# Patient Record
Sex: MALE | Race: WHITE | NOT HISPANIC OR LATINO | Employment: FULL TIME | URBAN - NONMETROPOLITAN AREA
[De-identification: names, ages, dates, MRNs, and addresses within clinical notes are randomized per-mention and may not be internally consistent; named-entity substitution may affect disease eponyms.]

---

## 2017-10-09 ENCOUNTER — OFFICE VISIT (OUTPATIENT)
Dept: URGENT CARE | Facility: PHYSICIAN GROUP | Age: 47
End: 2017-10-09
Payer: MEDICAID

## 2017-10-09 VITALS
RESPIRATION RATE: 16 BRPM | BODY MASS INDEX: 46.65 KG/M2 | WEIGHT: 315 LBS | SYSTOLIC BLOOD PRESSURE: 126 MMHG | DIASTOLIC BLOOD PRESSURE: 70 MMHG | HEIGHT: 69 IN | OXYGEN SATURATION: 98 % | TEMPERATURE: 97.7 F | HEART RATE: 102 BPM

## 2017-10-09 DIAGNOSIS — H10.31 ACUTE CONJUNCTIVITIS OF RIGHT EYE, UNSPECIFIED ACUTE CONJUNCTIVITIS TYPE: ICD-10-CM

## 2017-10-09 PROCEDURE — 99214 OFFICE O/P EST MOD 30 MIN: CPT | Performed by: PHYSICIAN ASSISTANT

## 2017-10-09 RX ORDER — POLYMYXIN B SULFATE AND TRIMETHOPRIM 1; 10000 MG/ML; [USP'U]/ML
1 SOLUTION OPHTHALMIC EVERY 4 HOURS
Qty: 10 ML | Refills: 0 | Status: SHIPPED | OUTPATIENT
Start: 2017-10-09 | End: 2017-10-16

## 2017-10-09 NOTE — PROGRESS NOTES
"Chief Complaint   Patient presents with   • Eye Problem     Rt side, yellow drainage       HISTORY OF PRESENT ILLNESS: Patient is a 47 y.o. male who presents today for the following:    Patient comes in for evaluation of right red eye that is itching, burning, and with exudate. Started last night. He woke up with a slightly stuck together this morning. He denies blurry vision, ocular pain, informed by sensation. He does not wear contact lenses. He has not tried any over-the-counter medication.    Patient Active Problem List    Diagnosis Date Noted   • Chronic combined systolic and diastolic congestive heart failure (CMS-HCC) 04/19/2016   • Abnormal CT scan, chest 04/19/2016   • Pulmonary HTN 04/19/2016   • Type 2 diabetes mellitus, controlled (CMS-HCC) 04/19/2016   • Hep C w/o coma, chronic (CMS-HCC) 09/11/2015   • Proteinuria 09/11/2015   • Hyperlipidemia 09/11/2015   • Bilateral chronic knee pain 09/11/2015   • Essential hypertension 09/02/2015   • Mild intermittent asthma without complication 09/02/2015   • Tobacco dependence 09/02/2015   • Right-sided low back pain without sciatica 09/02/2015       Allergies:Pcn [penicillins]    Current Outpatient Prescriptions Ordered in [facility]   Medication Sig Dispense Refill   • polymixin-trimethoprim (POLYTRIM) 43136-3.1 UNIT/ML-% Solution Place 1 Drop in both eyes every 4 hours for 7 days. 10 mL 0   • Ivabradine HCl 5 MG Tab Take  by mouth.     • carvedilol (COREG) 6.25 MG Tab Take 6.25 mg by mouth 2 times a day, with meals.     • Insulin Detemir (LEVEMIR FLEXPEN SC) Inject  as instructed.     • lisinopril (PRINIVIL) 5 MG Tab      • NON SPECIFIED DM testing supplies: glucometer, test strips, lancets  Dispense brand covered by patients insurance (TrueTest/True Result)  Testing frequency: BID and prn, #100, RF x 11  250.00 1 Act 0   • Insulin Syringe-Needle U-100 (INSULIN SYRINGE .3CC/29GX1/2\") 29G X 1/2\" 0.3 ML Misc To inject insulin once daily 30 Each 3   • cyclobenzaprine " (FLEXERIL) 10 MG Tab Take 10 mg by mouth 3 times a day as needed.     • ibuprofen (MOTRIN) 600 MG Tab Take 600 mg by mouth every 6 hours as needed.     • albuterol (VENTOLIN OR PROVENTIL) 108 (90 BASE) MCG/ACT Aero Soln inhalation aerosol Inhale 2 Puffs by mouth every 6 hours as needed. 8.5 g 0   • furosemide (LASIX) 20 MG Tab      • magnesium oxide (MAG-OX) 400 (241.3 MG) MG Tab tablet      • simvastatin (ZOCOR) 40 MG Tab      • spironolactone (ALDACTONE) 25 MG Tab        No current Epic-ordered facility-administered medications on file.        Past Medical History:   Diagnosis Date   • Chronic combined systolic and diastolic congestive heart failure (CMS-HCC) 2016    Severe systolic and diastolic CHF, EF 20% ECHO 2016   • Abnormal CT scan, chest 2016   • Pulmonary HTN 2016   • \ 2016   • Hep C w/o coma, chronic (CMS-HCC) 2015    Genotype 1A   • Proteinuria 2015    Noted on labs from [place] 2014   • Hyperlipidemia 2015    ; 2014   • Bilateral chronic knee pain 2015    chondromalacia patella   • Essential hypertension 2015   • Mild intermittent asthma without complication 2015   • Asthma    • Type II or unspecified type diabetes mellitus without mention of complication, not stated as uncontrolled        Social History   Substance Use Topics   • Smoking status: Former Smoker     Packs/day: 0.50     Years: 30.00     Types: Cigarettes     Quit date: 2017   • Smokeless tobacco: Never Used   • Alcohol use 2.4 - 3.0 oz/week     4 - 5 Cans of beer per week       Family Status   Relation Status   • Mother  at age 69   • Father Other   • Sister Alive   • Maternal Grandmother  at age 91   • Maternal Grandfather     90   • Maternal Uncle    • Paternal Grandfather    • Neg Hx      Family History   Problem Relation Age of Onset   • Diabetes Mother      amputee   • Heart Disease Maternal Grandmother    • Diabetes Maternal Uncle    • Cancer  "Paternal Grandfather    • Lung Disease Paternal Grandfather    • Stroke Neg Hx        ROS:    Review of Systems   Constitutional: Negative for fever, chills, weight loss and malaise/fatigue.   HENT: Negative for ear pain, nosebleeds, congestion, sore throat and neck pain.    Eyes: Negative for blurred vision.   Respiratory: Negative for cough, sputum production, shortness of breath and wheezing.    Cardiovascular: Negative for chest pain, palpitations, orthopnea and leg swelling.   Gastrointestinal: Negative for heartburn, nausea, vomiting and abdominal pain.   Genitourinary: Negative for dysuria, urgency and frequency.       Exam:  Blood pressure 126/70, pulse (!) 102, temperature 36.5 °C (97.7 °F), resp. rate 16, height 1.753 m (5' 9\"), weight (!) 168.6 kg (371 lb 9.6 oz), SpO2 98 %.  General: Well developed, well nourished. No distress.  HEENT: Conjunctiva injected in the right eye only with a mild amount of exudate. PERRL, EOMI.  Pulmonary: Clear to ausculation and percussion.  Normal effort. No rales, ronchi, or wheezing.   Cardiovascular: Regular rate and rhythm without murmur. No edema.   Neurologic: Grossly nonfocal.  Skin: Warm, dry, good turgor. No rashes in visible areas.   Psych: Normal mood. Alert and oriented x3. Judgment and insight is normal.    Assessment/Plan:  Use all medication as directed. Follow up for worsening or persistent symptoms.  1. Acute conjunctivitis of right eye, unspecified acute conjunctivitis type  polymixin-trimethoprim (POLYTRIM) 64694-9.1 UNIT/ML-% Solution       "

## 2017-10-17 ENCOUNTER — NON-PROVIDER VISIT (OUTPATIENT)
Dept: URGENT CARE | Facility: PHYSICIAN GROUP | Age: 47
End: 2017-10-17

## 2017-10-17 DIAGNOSIS — Z02.1 PRE-EMPLOYMENT DRUG SCREENING: ICD-10-CM

## 2017-10-17 LAB
AMP AMPHETAMINE: NORMAL
COC COCAINE: NORMAL
INT CON NEG: NEGATIVE
INT CON POS: POSITIVE
MET METHAMPHETAMINES: NORMAL
OPI OPIATES: NORMAL
PCP PHENCYCLIDINE: NORMAL
POC DRUG COMMENT 753798-OCCUPATIONAL HEALTH: NEGATIVE
THC: NORMAL

## 2017-10-17 PROCEDURE — 80305 DRUG TEST PRSMV DIR OPT OBS: CPT | Performed by: FAMILY MEDICINE

## 2018-01-10 ENCOUNTER — HOSPITAL ENCOUNTER (OUTPATIENT)
Dept: RADIOLOGY | Facility: MEDICAL CENTER | Age: 48
End: 2018-01-10
Attending: INTERNAL MEDICINE
Payer: MEDICAID

## 2018-01-10 DIAGNOSIS — B18.2 CHRONIC HEPATITIS C WITH HEPATIC COMA (HCC): ICD-10-CM

## 2018-01-10 DIAGNOSIS — I42.8 CARDIOMYOPATHY, NONISCHEMIC (HCC): ICD-10-CM

## 2018-01-10 DIAGNOSIS — J45.909 UNCOMPLICATED ASTHMA, UNSPECIFIED ASTHMA SEVERITY, UNSPECIFIED WHETHER PERSISTENT: ICD-10-CM

## 2018-01-10 DIAGNOSIS — E11.9 DIABETES MELLITUS WITHOUT COMPLICATION (HCC): ICD-10-CM

## 2018-01-10 PROCEDURE — 76700 US EXAM ABDOM COMPLETE: CPT

## 2018-02-26 ENCOUNTER — NON-PROVIDER VISIT (OUTPATIENT)
Dept: URGENT CARE | Facility: PHYSICIAN GROUP | Age: 48
End: 2018-02-26

## 2018-02-26 DIAGNOSIS — Z02.1 PRE-EMPLOYMENT DRUG SCREENING: ICD-10-CM

## 2018-02-26 LAB
AMP AMPHETAMINE: NORMAL
COC COCAINE: NORMAL
INT CON NEG: NORMAL
INT CON POS: NORMAL
MET METHAMPHETAMINES: NORMAL
OPI OPIATES: NORMAL
PCP PHENCYCLIDINE: NORMAL
POC DRUG COMMENT 753798-OCCUPATIONAL HEALTH: NEGATIVE
THC: NORMAL

## 2018-02-26 PROCEDURE — 80305 DRUG TEST PRSMV DIR OPT OBS: CPT | Performed by: PHYSICIAN ASSISTANT

## 2019-07-27 ENCOUNTER — HOSPITAL ENCOUNTER (INPATIENT)
Dept: HOSPITAL 8 - ED | Age: 49
LOS: 1 days | Discharge: LEFT BEFORE DISCHARGE | DRG: 292 | End: 2019-07-28
Attending: HOSPITALIST | Admitting: HOSPITALIST
Payer: MEDICAID

## 2019-07-27 VITALS — SYSTOLIC BLOOD PRESSURE: 122 MMHG | DIASTOLIC BLOOD PRESSURE: 85 MMHG

## 2019-07-27 VITALS — SYSTOLIC BLOOD PRESSURE: 121 MMHG | DIASTOLIC BLOOD PRESSURE: 87 MMHG

## 2019-07-27 VITALS — WEIGHT: 224.87 LBS | BODY MASS INDEX: 33.31 KG/M2 | HEIGHT: 69 IN

## 2019-07-27 VITALS — SYSTOLIC BLOOD PRESSURE: 108 MMHG | DIASTOLIC BLOOD PRESSURE: 77 MMHG

## 2019-07-27 VITALS — DIASTOLIC BLOOD PRESSURE: 77 MMHG | SYSTOLIC BLOOD PRESSURE: 109 MMHG

## 2019-07-27 VITALS — DIASTOLIC BLOOD PRESSURE: 76 MMHG | SYSTOLIC BLOOD PRESSURE: 107 MMHG

## 2019-07-27 DIAGNOSIS — D64.9: ICD-10-CM

## 2019-07-27 DIAGNOSIS — J45.909: ICD-10-CM

## 2019-07-27 DIAGNOSIS — E78.5: ICD-10-CM

## 2019-07-27 DIAGNOSIS — I42.9: ICD-10-CM

## 2019-07-27 DIAGNOSIS — Z91.14: ICD-10-CM

## 2019-07-27 DIAGNOSIS — Z82.49: ICD-10-CM

## 2019-07-27 DIAGNOSIS — I24.8: ICD-10-CM

## 2019-07-27 DIAGNOSIS — E11.65: ICD-10-CM

## 2019-07-27 DIAGNOSIS — F17.210: ICD-10-CM

## 2019-07-27 DIAGNOSIS — Z83.3: ICD-10-CM

## 2019-07-27 DIAGNOSIS — I11.0: Primary | ICD-10-CM

## 2019-07-27 DIAGNOSIS — Z59.0: ICD-10-CM

## 2019-07-27 DIAGNOSIS — I50.43: ICD-10-CM

## 2019-07-27 LAB
ALBUMIN SERPL-MCNC: 2.4 G/DL (ref 3.4–5)
ANION GAP SERPL CALC-SCNC: 8 MMOL/L (ref 5–15)
BASOPHILS # BLD AUTO: 0.02 X10^3/UL (ref 0–0.1)
BASOPHILS NFR BLD AUTO: 0 % (ref 0–1)
CALCIUM SERPL-MCNC: 8.5 MG/DL (ref 8.5–10.1)
CHLORIDE SERPL-SCNC: 108 MMOL/L (ref 98–107)
CREAT SERPL-MCNC: 1.1 MG/DL (ref 0.7–1.3)
EOSINOPHIL # BLD AUTO: 0.12 X10^3/UL (ref 0–0.4)
EOSINOPHIL NFR BLD AUTO: 2 % (ref 1–7)
ERYTHROCYTE [DISTWIDTH] IN BLOOD BY AUTOMATED COUNT: 15.4 % (ref 9.4–14.8)
EST. AVERAGE GLUCOSE BLD GHB EST-MCNC: 206 MG/DL (ref 0–126)
HBA1C MFR BLD: 8.8 % (ref 4.2–6.3)
LYMPHOCYTES # BLD AUTO: 1.47 X10^3/UL (ref 1–3.4)
LYMPHOCYTES NFR BLD AUTO: 23 % (ref 22–44)
MCH RBC QN AUTO: 26.1 PG (ref 27.5–34.5)
MCHC RBC AUTO-ENTMCNC: 31.6 G/DL (ref 33.2–36.2)
MCV RBC AUTO: 82.5 FL (ref 81–97)
MD: NO
MONOCYTES # BLD AUTO: 0.39 X10^3/UL (ref 0.2–0.8)
MONOCYTES NFR BLD AUTO: 6 % (ref 2–9)
NEUTROPHILS # BLD AUTO: 4.36 X10^3/UL (ref 1.8–6.8)
NEUTROPHILS NFR BLD AUTO: 69 % (ref 42–75)
PLATELET # BLD AUTO: 321 X10^3/UL (ref 130–400)
PMV BLD AUTO: 8.1 FL (ref 7.4–10.4)
RBC # BLD AUTO: 4.63 X10^6/UL (ref 4.38–5.82)
TROPONIN I SERPL-MCNC: 0.4 NG/ML (ref 0–0.04)
TROPONIN I SERPL-MCNC: 0.46 NG/ML (ref 0–0.04)
TROPONIN I SERPL-MCNC: 0.54 NG/ML (ref 0–0.04)

## 2019-07-27 PROCEDURE — 71045 X-RAY EXAM CHEST 1 VIEW: CPT

## 2019-07-27 PROCEDURE — 85025 COMPLETE CBC W/AUTO DIFF WBC: CPT

## 2019-07-27 PROCEDURE — 93306 TTE W/DOPPLER COMPLETE: CPT

## 2019-07-27 PROCEDURE — 84100 ASSAY OF PHOSPHORUS: CPT

## 2019-07-27 PROCEDURE — 82040 ASSAY OF SERUM ALBUMIN: CPT

## 2019-07-27 PROCEDURE — 82010 KETONE BODYS QUAN: CPT

## 2019-07-27 PROCEDURE — 93970 EXTREMITY STUDY: CPT

## 2019-07-27 PROCEDURE — 83036 HEMOGLOBIN GLYCOSYLATED A1C: CPT

## 2019-07-27 PROCEDURE — 80307 DRUG TEST PRSMV CHEM ANLYZR: CPT

## 2019-07-27 PROCEDURE — 36415 COLL VENOUS BLD VENIPUNCTURE: CPT

## 2019-07-27 PROCEDURE — 84443 ASSAY THYROID STIM HORMONE: CPT

## 2019-07-27 PROCEDURE — 82800 BLOOD PH: CPT

## 2019-07-27 PROCEDURE — 80061 LIPID PANEL: CPT

## 2019-07-27 PROCEDURE — 82962 GLUCOSE BLOOD TEST: CPT

## 2019-07-27 PROCEDURE — 82803 BLOOD GASES ANY COMBINATION: CPT

## 2019-07-27 PROCEDURE — 84484 ASSAY OF TROPONIN QUANT: CPT

## 2019-07-27 PROCEDURE — 93005 ELECTROCARDIOGRAM TRACING: CPT

## 2019-07-27 PROCEDURE — 80048 BASIC METABOLIC PNL TOTAL CA: CPT

## 2019-07-27 PROCEDURE — 36600 WITHDRAWAL OF ARTERIAL BLOOD: CPT

## 2019-07-27 PROCEDURE — 83880 ASSAY OF NATRIURETIC PEPTIDE: CPT

## 2019-07-27 PROCEDURE — 83735 ASSAY OF MAGNESIUM: CPT

## 2019-07-27 RX ADMIN — LISINOPRIL SCH MG: 5 TABLET ORAL at 20:03

## 2019-07-27 RX ADMIN — LISINOPRIL SCH MG: 5 TABLET ORAL at 10:33

## 2019-07-27 RX ADMIN — CARVEDILOL SCH MG: 3.12 TABLET, FILM COATED ORAL at 17:00

## 2019-07-27 RX ADMIN — FUROSEMIDE SCH MG: 10 INJECTION, SOLUTION INTRAMUSCULAR; INTRAVENOUS at 17:00

## 2019-07-27 RX ADMIN — FUROSEMIDE SCH MG: 10 INJECTION, SOLUTION INTRAMUSCULAR; INTRAVENOUS at 10:33

## 2019-07-27 RX ADMIN — INSULIN LISPRO SCH UNITS: 100 INJECTION, SOLUTION INTRAVENOUS; SUBCUTANEOUS at 20:09

## 2019-07-27 RX ADMIN — CARVEDILOL SCH MG: 3.12 TABLET, FILM COATED ORAL at 10:33

## 2019-07-27 RX ADMIN — NICOTINE SCH PATCH: 14 PATCH, EXTENDED RELEASE TRANSDERMAL at 10:32

## 2019-07-27 RX ADMIN — INSULIN LISPRO SCH UNITS: 100 INJECTION, SOLUTION INTRAVENOUS; SUBCUTANEOUS at 17:00

## 2019-07-27 SDOH — ECONOMIC STABILITY - HOUSING INSECURITY: HOMELESSNESS: Z59.0

## 2019-07-27 NOTE — NUR
REPORT FROM CHADD PEÑALOZA, ASSUME CARE OF PT AT THIS TIME.  PT RESTING 
COMFORTABLY, AWAITING REEVAL.  

-------------------------------------------------------------------------------

Addendum: 07/27/19 at 0658 by WHITNEY

-------------------------------------------------------------------------------

ERROR-AWAITING TRANSPORT TO FLOOR.

## 2019-07-27 NOTE — NUR
BILAT LEG SWELLING AND SOB W/ EXERCTION X 2-3 DAYS, HX CHF, T2DM. NOT COMPLIANT 
WITH ANY MEDICATIONS FOR DIABETES OR CHF

## 2019-07-28 VITALS — DIASTOLIC BLOOD PRESSURE: 72 MMHG | SYSTOLIC BLOOD PRESSURE: 103 MMHG

## 2019-07-28 VITALS — SYSTOLIC BLOOD PRESSURE: 103 MMHG | DIASTOLIC BLOOD PRESSURE: 64 MMHG

## 2019-07-28 VITALS — DIASTOLIC BLOOD PRESSURE: 66 MMHG | SYSTOLIC BLOOD PRESSURE: 99 MMHG

## 2019-07-28 LAB
ANION GAP SERPL CALC-SCNC: 6 MMOL/L (ref 5–15)
BASOPHILS # BLD AUTO: 0.02 X10^3/UL (ref 0–0.1)
BASOPHILS NFR BLD AUTO: 0 % (ref 0–1)
CALCIUM SERPL-MCNC: 8.4 MG/DL (ref 8.5–10.1)
CHLORIDE SERPL-SCNC: 108 MMOL/L (ref 98–107)
CHOL/HDL RATIO: 4.3
CREAT SERPL-MCNC: 0.96 MG/DL (ref 0.7–1.3)
EOSINOPHIL # BLD AUTO: 0.18 X10^3/UL (ref 0–0.4)
EOSINOPHIL NFR BLD AUTO: 3 % (ref 1–7)
ERYTHROCYTE [DISTWIDTH] IN BLOOD BY AUTOMATED COUNT: 15.7 % (ref 9.4–14.8)
HDL CHOL %: 23 % (ref 26–37)
HDL CHOLESTEROL (DIRECT): 23 MG/DL (ref 40–60)
LDL CHOLESTEROL,CALCULATED: 67 MG/DL (ref 54–169)
LDLC/HDLC SERPL: 2.9 {RATIO} (ref 0.5–3)
LYMPHOCYTES # BLD AUTO: 1.59 X10^3/UL (ref 1–3.4)
LYMPHOCYTES NFR BLD AUTO: 28 % (ref 22–44)
MCH RBC QN AUTO: 25.1 PG (ref 27.5–34.5)
MCHC RBC AUTO-ENTMCNC: 30.9 G/DL (ref 33.2–36.2)
MCV RBC AUTO: 81.5 FL (ref 81–97)
MD: NO
MONOCYTES # BLD AUTO: 0.4 X10^3/UL (ref 0.2–0.8)
MONOCYTES NFR BLD AUTO: 7 % (ref 2–9)
NEUTROPHILS # BLD AUTO: 3.48 X10^3/UL (ref 1.8–6.8)
NEUTROPHILS NFR BLD AUTO: 62 % (ref 42–75)
O2/TOTAL GAS SETTING VFR VENT: 98 %
PLATELET # BLD AUTO: 295 X10^3/UL (ref 130–400)
PMV BLD AUTO: 7.8 FL (ref 7.4–10.4)
RBC # BLD AUTO: 4.88 X10^6/UL (ref 4.38–5.82)
TRIGL SERPL-MCNC: 42 MG/DL (ref 50–200)
VLDLC SERPL CALC-MCNC: 8 MG/DL (ref 0–25)

## 2019-07-28 RX ADMIN — FUROSEMIDE SCH MG: 10 INJECTION, SOLUTION INTRAMUSCULAR; INTRAVENOUS at 09:00

## 2019-07-28 RX ADMIN — NICOTINE SCH PATCH: 14 PATCH, EXTENDED RELEASE TRANSDERMAL at 09:00

## 2019-07-28 RX ADMIN — LISINOPRIL SCH MG: 5 TABLET ORAL at 08:59

## 2019-07-28 RX ADMIN — CARVEDILOL SCH MG: 3.12 TABLET, FILM COATED ORAL at 08:59

## 2019-07-28 RX ADMIN — INSULIN LISPRO SCH UNITS: 100 INJECTION, SOLUTION INTRAVENOUS; SUBCUTANEOUS at 08:15

## 2019-08-03 ENCOUNTER — HOSPITAL ENCOUNTER (INPATIENT)
Dept: HOSPITAL 8 - ED | Age: 49
LOS: 14 days | DRG: 283 | End: 2019-08-17
Attending: INTERNAL MEDICINE | Admitting: INTERNAL MEDICINE
Payer: MEDICAID

## 2019-08-03 VITALS — WEIGHT: 288.36 LBS | HEIGHT: 69 IN | BODY MASS INDEX: 42.71 KG/M2

## 2019-08-03 DIAGNOSIS — I35.8: ICD-10-CM

## 2019-08-03 DIAGNOSIS — Z66: ICD-10-CM

## 2019-08-03 DIAGNOSIS — F41.9: ICD-10-CM

## 2019-08-03 DIAGNOSIS — I11.0: ICD-10-CM

## 2019-08-03 DIAGNOSIS — I21.4: Primary | ICD-10-CM

## 2019-08-03 DIAGNOSIS — E87.1: ICD-10-CM

## 2019-08-03 DIAGNOSIS — I50.43: ICD-10-CM

## 2019-08-03 DIAGNOSIS — I25.5: ICD-10-CM

## 2019-08-03 DIAGNOSIS — J96.01: ICD-10-CM

## 2019-08-03 DIAGNOSIS — E87.5: ICD-10-CM

## 2019-08-03 DIAGNOSIS — E83.42: ICD-10-CM

## 2019-08-03 DIAGNOSIS — I42.7: ICD-10-CM

## 2019-08-03 DIAGNOSIS — E11.65: ICD-10-CM

## 2019-08-03 DIAGNOSIS — Z63.8: ICD-10-CM

## 2019-08-03 DIAGNOSIS — Z79.4: ICD-10-CM

## 2019-08-03 DIAGNOSIS — J45.909: ICD-10-CM

## 2019-08-03 DIAGNOSIS — Z79.01: ICD-10-CM

## 2019-08-03 DIAGNOSIS — R18.8: ICD-10-CM

## 2019-08-03 DIAGNOSIS — K21.9: ICD-10-CM

## 2019-08-03 DIAGNOSIS — F15.10: ICD-10-CM

## 2019-08-03 DIAGNOSIS — I25.10: ICD-10-CM

## 2019-08-03 DIAGNOSIS — Z79.899: ICD-10-CM

## 2019-08-03 DIAGNOSIS — N17.9: ICD-10-CM

## 2019-08-03 DIAGNOSIS — I26.99: ICD-10-CM

## 2019-08-03 DIAGNOSIS — D50.9: ICD-10-CM

## 2019-08-03 DIAGNOSIS — Z91.14: ICD-10-CM

## 2019-08-03 DIAGNOSIS — I07.1: ICD-10-CM

## 2019-08-03 DIAGNOSIS — F17.210: ICD-10-CM

## 2019-08-03 DIAGNOSIS — E66.9: ICD-10-CM

## 2019-08-03 DIAGNOSIS — Z91.19: ICD-10-CM

## 2019-08-03 DIAGNOSIS — Z83.3: ICD-10-CM

## 2019-08-03 DIAGNOSIS — Z88.0: ICD-10-CM

## 2019-08-03 PROCEDURE — 84145 PROCALCITONIN (PCT): CPT

## 2019-08-03 PROCEDURE — 71275 CT ANGIOGRAPHY CHEST: CPT

## 2019-08-03 PROCEDURE — 93005 ELECTROCARDIOGRAM TRACING: CPT

## 2019-08-03 PROCEDURE — 93970 EXTREMITY STUDY: CPT

## 2019-08-03 PROCEDURE — 82040 ASSAY OF SERUM ALBUMIN: CPT

## 2019-08-03 PROCEDURE — 84484 ASSAY OF TROPONIN QUANT: CPT

## 2019-08-03 PROCEDURE — 87181 SC STD AGAR DILUTION PER AGT: CPT

## 2019-08-03 PROCEDURE — 84133 ASSAY OF URINE POTASSIUM: CPT

## 2019-08-03 PROCEDURE — 94640 AIRWAY INHALATION TREATMENT: CPT

## 2019-08-03 PROCEDURE — 83540 ASSAY OF IRON: CPT

## 2019-08-03 PROCEDURE — 83605 ASSAY OF LACTIC ACID: CPT

## 2019-08-03 PROCEDURE — 82436 ASSAY OF URINE CHLORIDE: CPT

## 2019-08-03 PROCEDURE — 76705 ECHO EXAM OF ABDOMEN: CPT

## 2019-08-03 PROCEDURE — 87040 BLOOD CULTURE FOR BACTERIA: CPT

## 2019-08-03 PROCEDURE — 92950 HEART/LUNG RESUSCITATION CPR: CPT

## 2019-08-03 PROCEDURE — 83036 HEMOGLOBIN GLYCOSYLATED A1C: CPT

## 2019-08-03 PROCEDURE — 80307 DRUG TEST PRSMV CHEM ANLYZR: CPT

## 2019-08-03 PROCEDURE — 80048 BASIC METABOLIC PNL TOTAL CA: CPT

## 2019-08-03 PROCEDURE — 93321 DOPPLER ECHO F-UP/LMTD STD: CPT

## 2019-08-03 PROCEDURE — 87070 CULTURE OTHR SPECIMN AEROBIC: CPT

## 2019-08-03 PROCEDURE — 87147 CULTURE TYPE IMMUNOLOGIC: CPT

## 2019-08-03 PROCEDURE — 71045 X-RAY EXAM CHEST 1 VIEW: CPT

## 2019-08-03 PROCEDURE — 82570 ASSAY OF URINE CREATININE: CPT

## 2019-08-03 PROCEDURE — 82803 BLOOD GASES ANY COMBINATION: CPT

## 2019-08-03 PROCEDURE — 93325 DOPPLER ECHO COLOR FLOW MAPG: CPT

## 2019-08-03 PROCEDURE — 84300 ASSAY OF URINE SODIUM: CPT

## 2019-08-03 PROCEDURE — 76700 US EXAM ABDOM COMPLETE: CPT

## 2019-08-03 PROCEDURE — 83735 ASSAY OF MAGNESIUM: CPT

## 2019-08-03 PROCEDURE — 85018 HEMOGLOBIN: CPT

## 2019-08-03 PROCEDURE — 82728 ASSAY OF FERRITIN: CPT

## 2019-08-03 PROCEDURE — C8924 2D TTE W OR W/O FOL W/CON,FU: HCPCS

## 2019-08-03 PROCEDURE — 87205 SMEAR GRAM STAIN: CPT

## 2019-08-03 PROCEDURE — 85379 FIBRIN DEGRADATION QUANT: CPT

## 2019-08-03 PROCEDURE — 85520 HEPARIN ASSAY: CPT

## 2019-08-03 PROCEDURE — 83550 IRON BINDING TEST: CPT

## 2019-08-03 PROCEDURE — 83880 ASSAY OF NATRIURETIC PEPTIDE: CPT

## 2019-08-03 PROCEDURE — 99285 EMERGENCY DEPT VISIT HI MDM: CPT

## 2019-08-03 PROCEDURE — 80053 COMPREHEN METABOLIC PANEL: CPT

## 2019-08-03 PROCEDURE — 36415 COLL VENOUS BLD VENIPUNCTURE: CPT

## 2019-08-03 PROCEDURE — 85025 COMPLETE CBC W/AUTO DIFF WBC: CPT

## 2019-08-03 PROCEDURE — 84100 ASSAY OF PHOSPHORUS: CPT

## 2019-08-03 PROCEDURE — 49083 ABD PARACENTESIS W/IMAGING: CPT

## 2019-08-03 PROCEDURE — 87324 CLOSTRIDIUM AG IA: CPT

## 2019-08-03 PROCEDURE — 81003 URINALYSIS AUTO W/O SCOPE: CPT

## 2019-08-03 PROCEDURE — 82962 GLUCOSE BLOOD TEST: CPT

## 2019-08-03 PROCEDURE — 36600 WITHDRAWAL OF ARTERIAL BLOOD: CPT

## 2019-08-03 PROCEDURE — 85014 HEMATOCRIT: CPT

## 2019-08-03 SDOH — SOCIAL STABILITY - SOCIAL INSECURITY: OTHER SPECIFIED PROBLEMS RELATED TO PRIMARY SUPPORT GROUP: Z63.8

## 2019-08-13 PROCEDURE — 0W9G30Z DRAINAGE OF PERITONEAL CAVITY WITH DRAINAGE DEVICE, PERCUTANEOUS APPROACH: ICD-10-PCS | Performed by: NURSE PRACTITIONER

## 2019-08-17 VITALS — SYSTOLIC BLOOD PRESSURE: 102 MMHG | DIASTOLIC BLOOD PRESSURE: 67 MMHG

## 2019-08-17 PROCEDURE — 5A12012 PERFORMANCE OF CARDIAC OUTPUT, SINGLE, MANUAL: ICD-10-PCS | Performed by: INTERNAL MEDICINE
